# Patient Record
Sex: MALE | Race: WHITE | Employment: FULL TIME | URBAN - METROPOLITAN AREA
[De-identification: names, ages, dates, MRNs, and addresses within clinical notes are randomized per-mention and may not be internally consistent; named-entity substitution may affect disease eponyms.]

---

## 2019-11-06 ENCOUNTER — OFFICE VISIT (OUTPATIENT)
Dept: SLEEP MEDICINE | Facility: CLINIC | Age: 49
End: 2019-11-06
Payer: COMMERCIAL

## 2019-11-06 VITALS
OXYGEN SATURATION: 97 % | HEART RATE: 59 BPM | SYSTOLIC BLOOD PRESSURE: 118 MMHG | BODY MASS INDEX: 26.26 KG/M2 | RESPIRATION RATE: 20 BRPM | DIASTOLIC BLOOD PRESSURE: 86 MMHG | HEIGHT: 70 IN | WEIGHT: 183.4 LBS

## 2019-11-06 DIAGNOSIS — G47.33 OSA (OBSTRUCTIVE SLEEP APNEA): Primary | ICD-10-CM

## 2019-11-06 PROCEDURE — 99204 OFFICE O/P NEW MOD 45 MIN: CPT | Performed by: INTERNAL MEDICINE

## 2019-11-06 ASSESSMENT — MIFFLIN-ST. JEOR: SCORE: 1698.15

## 2019-11-06 NOTE — NURSING NOTE
"/86   Pulse 59   Resp 20   Ht 1.778 m (5' 10\")   Wt 83.2 kg (183 lb 6.4 oz)   SpO2 97%   BMI 26.32 kg/m      Neck 40cm/15.75inches    DME-cpap, but doesn't use    ESS- 12  CAITY-19    Med Rec-complete    Jade Sotomayor, Medical Assistant 11/6/2019 1:24 PM      "

## 2019-11-06 NOTE — PATIENT INSTRUCTIONS
Here are the ranges based off your height and current weight.    Body mass index is 26.32 kg/m .        Underweight = <18.5  Normal weight = 18.5-24.9   Overweight = 25-29.9   Obesity = BMI of 30 or greater       Your BMI is Body mass index is 26.32 kg/m .  Weight management is a personal decision.  If you are interested in exploring weight loss strategies, the following discussion covers the approaches that may be successful. Body mass index (BMI) is one way to tell whether you are at a healthy weight, overweight, or obese. It measures your weight in relation to your height.  A BMI of 18.5 to 24.9 is in the healthy range. A person with a BMI of 25 to 29.9 is considered overweight, and someone with a BMI of 30 or greater is considered obese. More than two-thirds of American adults are considered overweight or obese.  Being overweight or obese increases the risk for further weight gain. Excess weight may lead to heart disease and diabetes.  Creating and following plans for healthy eating and physical activity may help you improve your health.  Weight control is part of healthy lifestyle and includes exercise, emotional health, and healthy eating habits. Careful eating habits lifelong are the mainstay of weight control. Though there are significant health benefits from weight loss, long-term weight loss with diet alone may be very difficult to achieve- studies show long-term success with dietary management in less than 10% of people. Attaining a healthy weight may be especially difficult to achieve in those with severe obesity. In some cases, medications, devices and surgical management might be considered.  What can you do?  If you are overweight or obese and are interested in methods for weight loss, you should discuss this with your provider.     Consider reducing daily calorie intake by 500 calories.     Keep a food journal.     Avoiding skipping meals, consider cutting portions instead.    Diet combined with  exercise helps maintain muscle while optimizing fat loss. Strength training is particularly important for building and maintaining muscle mass. Exercise helps reduce stress, increase energy, and improves fitness. Increasing exercise without diet control, however, may not burn enough calories to loose weight.       Start walking three days a week 10-20 minutes at a time    Work towards walking thirty minutes five days a week     Eventually, increase the speed of your walking for 1-2 minutes at time    In addition, we recommend that you review healthy lifestyles and methods for weight loss available through the National Institutes of Health patient information sites:  http://win.niddk.nih.gov/publications/index.htm    And look into health and wellness programs that may be available through your health insurance provider, employer, local community center, or [employer].    Weight management plan: Patient was referred to their PCP to discuss a diet and exercise plan.    Your blood pressure was checked while you were in clinic today.  Please read the guidelines below about what these numbers mean and what you should do about them.  Your systolic blood pressure is the top number.  This is the pressure when the heart is pumping.  Your diastolic blood pressure is the bottom number.  This is the pressure in between beats.  If your systolic blood pressure is less than 120 and your diastolic blood pressure is less than 80, then your blood pressure is normal. There is nothing more that you need to do about it  If your systolic blood pressure is 120-139 or your diastolic blood pressure is 80-89, your blood pressure may be higher than it should be.  You should have your blood pressure re-checked within a year by a primary care provider.  If your systolic blood pressure is 140 or greater or your diastolic blood pressure is 90 or greater, you may have high blood pressure.  High blood pressure is treatable, but if left untreated  "over time it can put you at risk for heart attack, stroke, or kidney failure.  You should have your blood pressure re-checked by a primary care provider within the next four weeks.    MY TREATMENT INFORMATION FOR SLEEP APNEA-  Agus Kang    DOCTOR : Tiana Kraus MD  SLEEP CENTER :      MY CONTACT NUMBER:     Am I having a sleep study at a sleep center?  Make sure you have an appointment for the study before you leave!    Am I having a home sleep study?  Watch this video:  [email or web address]?[email or web address]  Please verify your insurance coverage with your insurance carrier    Frequently asked questions:  1. What is Obstructive Sleep Apnea (BEST)? BEST is the most common type of sleep apnea. Apnea means, \"without breath.\"  Apnea is most often caused by narrowing or collapse of the upper airway as muscles relax during sleep.   Almost everyone has occasional apneas. Most people with sleep apnea have had brief interruptions at night frequently for many years.  The severity of sleep apnea is related to how frequent and severe the events are.   2. What are the consequences of BEST? Symptoms include: feeling sleepy during the day, snoring loudly, gasping or stopping of breathing, trouble sleeping, and occasionally morning headaches or heartburn at night.  Sleepiness can be serious and even increase the risk of falling asleep while driving. Other health consequences may include development of high blood pressure and other cardiovascular disease in persons who are susceptible. Untreated BEST  can contribute to heart disease, stroke and diabetes.   3. What are the treatment options? In most situations, sleep apnea is a lifelong disease that must be managed with daily therapy. Medications are not effective for sleep apnea and surgery is generally not considered until other therapies have been tried. Your treatment is your choice . Continuous Positive " Airway (CPAP) works right away and is the therapy that is effective in nearly everyone. An oral device to hold your jaw forward is usually the next most reliable option. Other options include postioning devices (to keep you off your back), weight loss, and surgery including a tongue pacing device. There is more detail about some of these options below.    Important tips for using CPAP and similar devices   Know your equipment:  CPAP is continuous positive airway pressure that prevents obstructive sleep apnea by keeping the throat from collapsing while you are sleeping. In most cases, the device is  smart  and can slowly self-adjusts if your throat collapses and keeps a record every day of how well you are treated-this information is available to you and your care team.  BPAP is bilevel positive airway pressure that keeps your throat open and also assists each breath with a pressure boost to maintain adequate breathing.  Special kinds of BPAP are used in patients who have inadequate breathing from lung or heart disease. In most cases, the device is  smart  and can slowly self-adjusts to assist breathing. Like CPAP, the device keeps a record of how well you are treated.  Your mask is your connection to the device. You get to choose what feels most comfortable and the staff will help to make sure if fits. Here: are some examples of the different masks that are available:       Key points to remember on your journey with sleep apnea:  1. Sleep study.  PAP devices often need to be adjusted during a sleep study to show that they are effective and adjusted right.  2. Good tips to remember: Try wearing just the mask during a quiet time during the day so your body adapts to wearing it. A humidifier is recommended for comfort in most cases to prevent drying of your nose and throat. Allergy medication from your provider may help you if you are having nasal congestion.  3. Getting settled-in. It takes more than one night for  most of us to get used to wearing a mask. Try wearing just the mask during a quiet time during the day so your body adapts to wearing it. A humidifier is recommended for comfort in most cases. Our team will work with you carefully on the first day and will be in contact within 4 days and again at 2 and 4 weeks for advice and remote device adjustments. Your therapy is evaluated by the device each day.   4. Use it every night. The more you are able to sleep naturally for 7-8 hours, the more likely you will have good sleep and to prevent health risks or symptoms from sleep apnea. Even if you use it 4 hours it helps. Occasionally all of us are unable to use a medical therapy, in sleep apnea, it is not dangerous to miss one night.   5. Communicate. Call our skilled team on the number provided on the first day if your visit for problems that make it difficult to wear the device. Over 2 out of 3 patients can learn to wear the device long-term with help from our team. Remember to call our team or your sleep providers if you are unable to wear the device as we may have other solutions for those who cannot adapt to mask CPAP therapy. It is recommended that you sleep your sleep provider within the first 3 months and yearly after that if you are not having problems.   6. Use it for your health. We encourage use of CPAP masks during daytime quiet periods to allow your face and brain to adapt to the sensation of CPAP so that it will be a more natural sensation to awaken to at night or during naps. This can be very useful during the first few weeks or months of adapting to CPAP though it does not help medically to wear CPAP during wakefulness and  should not be used as a strategy just to meet guidelines.  7. Take care of your equipment. Make sure you clean your mask and tubing using directions every day and that your filter and mask are replaced as recommended or if they are not working.     BESIDES CPAP, WHAT OTHER THERAPIES ARE  THERE?    Positioning Device  Positioning devices are generally used when sleep apnea is mild and only occurs on your back.This example shows a pillow that straps around the waist. It may be appropriate for those whose sleep study shows milder sleep apnea that occurs primarily when lying flat on one's back. Preliminary studies have shown benefit but effectiveness at home may need to be verified by a home sleep test. These devices are generally not covered by medical insurance.  Examples of devices that maintain sleeping on the back to prevent snoring and mild sleep apnea.    Belt type body positioner  Http://[employer]/    Electronic reminder  Http://nightshifttherapy.com/  Http://www.awe.sm.[employer].au/      Oral Appliance  What is oral appliance therapy?  An oral appliance device fits on your teeth at night like a retainer used after having braces. The device is made by a specialized dentist and requires several visits over 1-2 months before a manufactured device is made to fit your teeth and is adjusted to prevent your sleep apnea. Once an oral device is working properly, snoring should be improved. A home sleep test may be recommended at that time if to determine whether the sleep apnea is adequately treated.       Some things to remember:  -Oral devices are often, but not always, covered by your medical insurance. Be sure to check with your insurance provider.   -If you are referred for oral therapy, you will be given a list of specialized dentists to consider or you may choose to visit the Web site of the American Academy of Dental Sleep Medicine  -Oral devices are less likely to work if you have severe sleep apnea or are extremely overweight.     More detailed information  An oral appliance is a small acrylic device that fits over the upper and lower teeth  (similar to a retainer or a mouth guard). This device slightly moves jaw forward, which moves the base of the tongue forward, opens the airway, improves  breathing for effective treat snoring and obstructive sleep apnea in perhaps 7 out of 10 people .  The best working devices are custom-made by a dental device  after a mold is made of the teeth 1, 2, 3.  When is an oral appliance indicated?  Oral appliance therapy is recommended as a first-line treatment for patients with primary snoring, mild sleep apnea, and for patients with moderate sleep apnea who prefer appliance therapy to use of CPAP4, 5. Severity of sleep apnea is determined by sleep testing and is based on the number of respiratory events per hour of sleep.   How successful is oral appliance therapy?  The success rate of oral appliance therapy in patients with mild sleep apnea is 75-80% while in patients with moderate sleep apnea it is 50-70%. The chance of success in patients with severe sleep apnea is 40-50%. The research also shows that oral appliances have a beneficial effect on the cardiovascular health of BEST patients at the same magnitude as CPAP therapy7.  Oral appliances should be a second-line treatment in cases of severe sleep apnea, but if not completely successful then a combination therapy utilizing CPAP plus oral appliance therapy may be effective. Oral appliances tend to be effective in a broad range of patients although studies show that the patients who have the highest success are females, younger patients, those with milder disease, and less severe obesity. 3, 6.   Finding a dentist that practices dental sleep medicine  Specific training is available through the American Academy of Dental Sleep Medicine for dentists interested in working in the field of sleep. To find a dentist who is educated in the field of sleep and the use of oral appliances, near you, visit the Web site of the American Academy of Dental Sleep Medicine.    References  1. Won et al. Objectively measured vs self-reported compliance during oral appliance therapy for sleep-disordered breathing. Chest  2013; 144(5): 6544-1534.  2. Dayami, et al. Objective measurement of compliance during oral appliance therapy for sleep-disordered breathing. Thorax 2013; 68(1): 91-96.  3. Asha et al. Mandibular advancement devices in 620 men and women with BEST and snoring: tolerability and predictors of treatment success. Chest 2004; 125: 1119-2619.  4. Scarlet et al. Oral appliances for snoring and BEST: a review. Sleep 2006; 29: 244-262.  5. Janette et al. Oral appliance treatment for BEST: an update. J Clin Sleep Med 2014; 10(2): 215-227.  6. Willy et al. Predictors of OSAH treatment outcome. J Dent Res 2007; 86: 5428-3136.      Weight Loss:    Weight loss is a long-term strategy that may improve sleep apnea in some patients.    Weight management is a personal decision and the decision should be based on your interest and the potential benefits.  If you are interested in exploring weight loss strategies, the following discussion covers the impact on weight loss on sleep apnea and the approaches that may be successful.    Being overweight does not necessarily mean you will have health consequences.  Those who have BMI over 35 or over 27 with existing medical conditions carries greater risk.   Weight loss decreases severity of sleep apnea in most people with obesity. For those with mild obesity who have developed snoring with weight gain, even 15-30 pound weight loss can improve and occasionally eliminate sleep apnea.  Structured and life-long dietary and health habits are necessary to lose weight and keep healthier weight levels.     Though there may be significant health benefits from weight loss, long-term weight loss is very difficult to achieve- studies show success with dietary management in less than 10% of people. In addition, substantial weight loss may require years of dietary control and may be difficult if patients have severe obesity. In these cases, surgical management may be considered.  Finally,  older individuals who have tolerated obesity without health complications may be less likely to benefit from weight loss strategies.        Your BMI is Body mass index is 26.32 kg/m .  Weight management is a personal decision.  If you are interested in exploring weight loss strategies, the following discussion covers the approaches that may be successful. Body mass index (BMI) is one way to tell whether you are at a healthy weight, overweight, or obese. It measures your weight in relation to your height.  A BMI of 18.5 to 24.9 is in the healthy range. A person with a BMI of 25 to 29.9 is considered overweight, and someone with a BMI of 30 or greater is considered obese. More than two-thirds of American adults are considered overweight or obese.  Being overweight or obese increases the risk for further weight gain. Excess weight may lead to heart disease and diabetes.  Creating and following plans for healthy eating and physical activity may help you improve your health.  Weight control is part of healthy lifestyle and includes exercise, emotional health, and healthy eating habits. Careful eating habits lifelong are the mainstay of weight control. Though there are significant health benefits from weight loss, long-term weight loss with diet alone may be very difficult to achieve- studies show long-term success with dietary management in less than 10% of people. Attaining a healthy weight may be especially difficult to achieve in those with severe obesity. In some cases, medications, devices and surgical management might be considered.  What can you do?  If you are overweight or obese and are interested in methods for weight loss, you should discuss this with your provider.     Consider reducing daily calorie intake by 500 calories.     Keep a food journal.     Avoiding skipping meals, consider cutting portions instead.    Diet combined with exercise helps maintain muscle while optimizing fat loss. Strength training  is particularly important for building and maintaining muscle mass. Exercise helps reduce stress, increase energy, and improves fitness. Increasing exercise without diet control, however, may not burn enough calories to loose weight.       Start walking three days a week 10-20 minutes at a time    Work towards walking thirty minutes five days a week     Eventually, increase the speed of your walking for 1-2 minutes at time    In addition, we recommend that you review healthy lifestyles and methods for weight loss available through the National Institutes of Health patient information sites:  http://win.niddk.nih.gov/publications/index.htm    And look into health and wellness programs that may be available through your health insurance provider, employer, local community center, or [employer].          Surgery:    Surgery for obstructive sleep apnea is considered generally only when other therapies fail to work. Surgery may be discussed with you if you are having a difficult time tolerating CPAP and or when there is an abnormal structure that requires surgical correction.  Nose and throat surgeries often enlarge the airway to prevent collapse.  Most of these surgeries create pain for 1-2 weeks and up to half of the most common surgeries are not effective throughout life.  You should carefully discuss the benefits and drawbacks to surgery with your sleep provider and surgeon to determine if it is the best solution for you.   More information  Surgery for BEST is directed at areas that are responsible for narrowing or complete obstruction of the airway during sleep.  There are a wide range of procedures available to enlarge and/or stabilize the airway to prevent blockage of breathing in the three major areas where it can occur: the palate, tongue, and nasal regions.  Successful surgical treatment depends on the accurate identification of the factors responsible for obstructive sleep apnea in each person.  A personalized  approach is required because there is no single treatment that works well for everyone.  Because of anatomic variation, consultation with an examination by a sleep surgeon is a critical first step in determining what surgical options are best for each patient.  In some cases, examination during sedation may be recommended in order to guide the selection of procedures.  Patients will be counseled about risks and benefits as well as the typical recovery course after surgery. Surgery is typically not a cure for a person s BEST.  However, surgery will often significantly improve one s BEST severity (termed  success rate ).  Even in the absence of a cure, surgery will decrease the cardiovascular risk associated with OSA7; improve overall quality of life8 (sleepiness, functionality, sleep quality, etc).      Palate Procedures:  Patients with BEST often have narrowing of their airway in the region of their tonsils and uvula.  The goals of palate procedures are to widen the airway in this region as well as to help the tissues resist collapse.  Modern palate procedure techniques focus on tissue conservation and soft tissue rearrangement, rather than tissue removal.  Often the uvula is preserved in this procedure. Residual sleep apnea is common in patient after pharyngoplasty with an average reduction in sleep apnea events of 33%2.      Tongue Procedures:  ExamWhile patients are awake, the muscles that surround the throat are active and keep this region open for breathing. These muscles relax during sleep, allowing the tongue and other structures to collapse and block breathing.  There are several different tongue procedures available.  Selection of a tongue base procedure depends on characteristics seen on physical exam.  Generally, procedures are aimed at removing bulky tissues in this area or preventing the back of the tongue from falling back during sleep.  Success rates for tongue surgery range from 50-62%3.    Hypoglossal  Nerve Stimulation:  Hypoglossal nerve stimulation has recently received approval from the [place] Food and Drug Administration for the treatment of obstructive sleep apnea.  This is based on research showing that the system was safe and effective in treating sleep apnea6.  Results showed that the median AHI score decreased 68%, from 29.3 to 9.0. This therapy uses an implant system that senses breathing patterns and delivers mild stimulation to airway muscles, which keeps the airway open during sleep.  The system consists of three fully implanted components: a small generator (similar in size to a pacemaker), a breathing sensor, and a stimulation lead.  Using a small handheld remote, a patient turns the therapy on before bed and off upon awakening.    Candidates for this device must be greater than 22 years of age, have moderate to severe BEST (AHI between 20-65), BMI less than 32, have tried CPAP/oral appliance without success, and have appropriate upper airway anatomy (determined by a sleep endoscopy performed by Dr. Carty).    Hypoglossal Nerve Stimulation Pathway:    The sleep surgeon s office will work with the patient through the insurance prior-authorization process (including communications and appeals).    Nasal Procedures:  Nasal obstruction can interfere with nasal breathing during the day and night.  Studies have shown that relief of nasal obstruction can improve the ability of some patients to tolerate positive airway pressure therapy for obstructive sleep apnea1.  Treatment options include medications such as nasal saline, topical corticosteroid and antihistamine sprays, and oral medications such as antihistamines or decongestants. Non-surgical treatments can include external nasal dilators for selected patients. If these are not successful by themselves, surgery can improve the nasal airway either alone or in combination with these other options.      Combination Procedures:  Combination of  surgical procedures and other treatments may be recommended, particularly if patients have more than one area of narrowing or persistent positional disease.  The success rate of combination surgery ranges from 66-80%2,3.    References  1. Isabel HO. The Role of the Nose in Snoring and Obstructive Sleep Apnoea: An Update.  Eur Arch Otorhinolaryngol. 2011; 268: 1365-73.  2.  Jomar SM; Hoa JA; Lucy JR; Pallanch JF; Niki MB; Oskar SG; Jp CROWLEY. Surgical modifications of the upper airway for obstructive sleep apnea in adults: a systematic review and meta-analysis. SLEEP 2010;33(10):3136-4193. Paula SENIOR. Hypopharyngeal surgery in obstructive sleep apnea: an evidence-based medicine review.  Arch Otolaryngol Head Neck Surg. 2006 Feb;132(2):206-13.  3. Allen YH1, Addy Y, Jose Daniel MEÑO. The efficacy of anatomically based multilevel surgery for obstructive sleep apnea. Otolaryngol Head Neck Surg. 2003 Oct;129(4):327-35.  4. Paula SENIOR, Goldberg A. Hypopharyngeal Surgery in Obstructive Sleep Apnea: An Evidence-Based Medicine Review. Arch Otolaryngol Head Neck Surg. 2006 Feb;132(2):206-13.  5. Henny LIN et al. Upper-Airway Stimulation for Obstructive Sleep Apnea.  N Engl J Med. 2014 Jan 9;370(2):139-49.  6. Nel Y et al. Increased Incidence of Cardiovascular Disease in Middle-aged Men with Obstructive Sleep Apnea. Am J Respir Crit Care Med; 2002 166: 159-165  7. Jerad EM et al. Studying Life Effects and Effectiveness of Palatopharyngoplasty (SLEEP) study: Subjective Outcomes of Isolated Uvulopalatopharyngoplasty. Otolaryngol Head Neck Surg. 2011; 144: 623-631.      .Please do not drive/operate heavy machinery if  drowsy or sleepy;  pull over if drowsy.
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
## 2019-11-14 ENCOUNTER — OFFICE VISIT (OUTPATIENT)
Dept: SLEEP MEDICINE | Facility: CLINIC | Age: 49
End: 2019-11-14
Payer: COMMERCIAL

## 2019-11-14 ENCOUNTER — TRANSFERRED RECORDS (OUTPATIENT)
Dept: HEALTH INFORMATION MANAGEMENT | Facility: CLINIC | Age: 49
End: 2019-11-14

## 2019-11-14 DIAGNOSIS — G47.33 OSA (OBSTRUCTIVE SLEEP APNEA): ICD-10-CM

## 2019-11-14 DIAGNOSIS — G47.33 OBSTRUCTIVE SLEEP APNEA (ADULT) (PEDIATRIC): ICD-10-CM

## 2019-11-14 PROCEDURE — 95800 SLP STDY UNATTENDED: CPT | Performed by: INTERNAL MEDICINE

## 2019-11-14 NOTE — PROGRESS NOTES
Pt is completing a home sleep test. Pt was instructed on how to put on the Noxturnal T3 device and associated equipment before going to bed and given the opportunity to practice putting it on before leaving the sleep center. Pt was reminded to bring the home sleep test kit back to the center tomorrow, at agreed upon time for download and reporting. Patient had bracelet secured to right wrist, instructed on use of WATCHPAT device

## 2019-11-15 ENCOUNTER — DOCUMENTATION ONLY (OUTPATIENT)
Dept: SLEEP MEDICINE | Facility: CLINIC | Age: 49
End: 2019-11-15
Payer: COMMERCIAL

## 2019-11-15 NOTE — PROCEDURES
"WatchPAT - HOME SLEEP STUDY INTERPRETATION    Patient: Agus Kang  MRN: [id number]  YOB: 1970  Study Date: 11/15/2019  Referring Provider: No Ref-Primary, Physician;   Ordering Provider: Miranda Kraus MD    Chain of custody patient verification was enabled.  Chain of custody verification was present throughout the entire study.     Indications for Home Study: Agus Kang is a 49 year old male with previous history of obstructive sleep apnea who presents for verification of current severity with symptoms suggestive of obstructive sleep apnea.  The medical records suggest this patient may be a  however I was unable to confirm this by telephone today.    Estimated body mass index is 26.32 kg/m  as calculated from the following:    Height as of 11/6/19: 1.778 m (5' 10\").    Weight as of 11/6/19: 83.2 kg (183 lb 6.4 oz).  Total score - [place]: 12 (11/6/2019  1:17 PM)  STOP-BANG: Previously diagnosed    Data: A full night home sleep study was performed recording the standard physiologic parameters including peripheral arterial tonometry (PAT), sound/snoring, body position,  movement, sound, and oxygen saturation by pulse oximetry. Pulse rate was estimated by oximetry recording. Sleep staging (wake, REM, light, and deep sleep) was derived from PAT signal.  This study was considered adequate based on > 4 hours of quality oximetry and respiratory recording. As specified by the AASM Manual for the Scoring of Sleep and Associated events, version 2.3, Rule VIII.D 1B, 4% oxygen desaturation scoring for hypopneas is used as a standard of care on all home sleep apnea testing.    Total Recording Time: 7  hrs, 3 min  Total Sleep Time: 6 hrs, 44 min  % of Sleep Time REM: 22.4%    Respiratory:  Snoring: Snoring was present up to 70 dB.  Respiratory events: The PAT respiratory disturbance index [pRDI] was 54 events per hour.  The PAT apnea/hypopnea index [pAHI] was 53 events per hour.  HERIBERTO was " 52 events per hour.  During REM sleep the pAHI was 39.  Sleep Associated Hypoxemia: sustained hypoxemia was present. Mean oxygen saturation was 91 %.  Minimum was 70 %.  Time with saturation less than 88% was 75 minutes.    Heart Rate: By pulse oximetry normal rate was noted.     Position: Percent of time spent: supine -87 %, prone -0 %, on right -13 %, on left -0 5%.  pAHI was 55 per hour supine, - per hour prone, 39 per hour on right side, and - per hour on left side.     Assessment:   Severe obstructive sleep apnea.  Sleep associated hypoxemia was present.    Recommendations:  The most immediately effective therapy for resolution of sleep apnea and associated symptoms is auto titration CPAP.  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    Suggest optimizing sleep hygiene and avoiding sleep deprivation.  Weight management.    Diagnosis Code(s): Obstructive Sleep Apnea G47.33, Hypoxemia G47.36    SHYLA COHEN MD, November 15, 2019   Diplomate, American Board of Internal Medicine, Sleep Medicine

## 2019-11-15 NOTE — PROGRESS NOTES
Telephone message left for the patient to indicate that he has to refer to his MyChart for results of his peripheral arterial tonometry sleep study demonstrating severe obstructive sleep apnea.  We have offered him assistance in initiating treatment so that he may need the AASI Guide for Aviation Medical Examiners:    JOSEPH Assisted - All Classes - Obstructive Sleep Apnea (BEST)  Examiners may re-issue an airman medical certificate to airmen currently on an AASI for BEST if the airman provides the following:   An Authorization granted by the FAA;   Signed Airman Compliance with Treatment form or equivalent from the airman attesting to absence of BEST symptoms and continued daily use of prescribed therapy; and   A current status report from the treating physician indicating that BEST treatment is still effective.   For CPAP/ BIPAP/ APAP:   A copy of the cumulative annual PAP device report which shows actual time used (rather than a report typically generated for insurance providers which only shows if use is greater or less than 4 hours). Target goal should show use for at least 75% of sleep periods and an average minimum of 6 hours use per sleep period.   For persons with an established diagnosis of BEST who do not have a recording CPAP, a one year exception will be allowed to provide a personal statement that they regularly use CPAP and before each shift when performing flight or safety duties.   For Dental Devices and/or for Positional Devices: No conditions known to be co-morbid with BEST (e.g., diabetes mellitus, hypertension treated with more than two medications, atrial fibrillation, etc). Once Dental Devices with recording / monitoring capability are available, reports must be submitted.   For Surgery: For successfully treated surgical patients, a statement attesting to the continued absence of BEST symptoms is required.   Defer to the [facility] or the Region for further review if:   Concerns about adequacy of therapy or  non-compliance;   Significant weight gain or development of conditions known to be co-morbid with BEST (e.g., diabetes mellitus, hypertension treated with more than two medications, atrial fibrillation, etc).   Note: The Examiner may request AMCD review to discontinue the AASI if there are indications that the airman no longer has BEST (e.g., significant weight loss and a negative study or surgical intervention followed by 3 years of symptom abatement and absence of significant weight gain or co-morbid conditions). In most cases, a follow-up sleep study will be required to remove the AASI.

## 2019-11-20 ENCOUNTER — VIRTUAL VISIT (OUTPATIENT)
Dept: SLEEP MEDICINE | Facility: CLINIC | Age: 49
End: 2019-11-20
Payer: COMMERCIAL

## 2019-11-20 DIAGNOSIS — G47.33 OSA ON CPAP: Primary | ICD-10-CM

## 2019-11-20 PROCEDURE — 99442 ZZC PHYSICIAN TELEPHONE EVALUATION 11-20 MIN: CPT | Performed by: INTERNAL MEDICINE

## 2019-11-20 NOTE — PROGRESS NOTES
Patient opted to conduct today's return visit via telephone vs an in person visit to the clinic.    I spoke with: patient    The reason for the telephone visit was: follow up on sleep study test results    Jade Sotomayor, Medical Assistant 11/20/2019 11:49 AM

## 2019-11-20 NOTE — Clinical Note
Don harrington, spoke to pt. He is willing to try CPAP. DME orders in [facility]. Plz call [facility] for urgent CPAP set up. He will shanel f/u  visit in 4-5 weeks after starting CPAP.Thanks,Miranda

## 2019-11-25 NOTE — PROGRESS NOTES
Sleep clinic telephone visit note    Date of  visit: November 20,2019    Purpose of visit: Review results of home sleep study     HPI: Agus Kang is a 49 year old male with previous history of obstructive sleep apnea who presented to sleep clinic  for verification of current severity with symptoms suggestive of obstructive sleep apnea.  WatchPAT was obtained on 11/15/2019 to evaluate for BEST.  Telephone visit has been scheduled today to review the test results and discuss plan of care.    WatchPAT results(11/15/2019) :  Total Recording Time: 7  hrs, 3 min   Total Sleep Time: 6 hrs, 44 min   % of Sleep Time REM: 22.4%     Respiratory:   Snoring: Snoring was present.  Respiratory events: The PAT respiratory disturbance index [pRDI] was 54 events per hour.  The PAT apnea/hypopnea index [pAHI] was 53 events per hour.  HERIBERTO was 52 events per hour.  During REM sleep the pAHI was 39.   Sleep Associated Hypoxemia: sustained hypoxemia was present. Mean oxygen saturation was 91 %.  Minimum was 70 %.  Time with saturation less than 88% was 75 minutes.     Heart Rate: By pulse oximetry normal rate was noted.     Position: Percent of time spent: supine -87 %, prone -0 %, on right -13 %, on left -0 5%.   pAHI was 55 per hour supine, - per hour prone, 39 per hour on right side, and - per hour on left side.     Assessment:   Severe obstructive sleep apnea.   Sleep associated hypoxemia was present.     The test results were  discussed with the patient in detail. Patient also received MyChart communication about the results and plan of care from Dr. Johnson.    Assessment/plan:  Severe obstructive sleep apnea with sleep associated hypoxemia.  We discussed the consequences of untreated sleep apnea of this degree of severity and the need for treatment.  We also discussed that the most immediately effective therapy for resolution of sleep apnea and associated symptoms is auto titrating continuous positive airway pressure-CPAP.  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     He was apprehensive about the CPAP from his previous experience. I explained to him that this time it will be through the proper channel, going through the DME getting proper mask fitting and follow-up with our sleep therapy management program and review of compliance measures etc. with which hopefully he will be able to comply with the treatment.  He was willing to try the auto CPAP.  Prescription was provided for auto CPAP with pressure settings 5-15 cmH2O. Recommended him to use the device regularly during sleep and instructed him to get back to us if in case he has any interface problems.  Recommend obtaining an overnight oximetry in 7 to 10 days after initiating CPAP treatment to check for resolution of the hypoxemia. Results of the oximetry will be communicated with him via my chart. He will follow-up at the sleep clinic in 4 to 5 weeks after initiating treatment from the compliance measures will be reviewed.    Patient was strongly advised to avoid flying, driving, operating any heavy machinery or other hazardous situations while drowsy or sleepy.  Patient was counseled on the importance of driving while alert, to pull over if drowsy, or nap before getting into the vehicle if sleepy.     The above note was dictated using voice recognition software. Although reviewed after completion, some word and grammatical error may remain . Please contact the author for any clarifications.    \"I spent a total of 16 minutes with Agus Kang during today's telephone visit. Over 50% of this time was spent counseling the patient and  coordinating care regarding BEST, CPAP treatment.\"     Tiana Kraus MD  [facility] [address] " [id number]  [phone]  Dept: [phone]    .

## 2019-11-26 NOTE — PROGRESS NOTES
Emailed urgent setup to [facility] Sylvie GARCIA. To call Agus to setup on autopap.

## 2019-11-29 ENCOUNTER — DOCUMENTATION ONLY (OUTPATIENT)
Dept: SLEEP MEDICINE | Facility: CLINIC | Age: 49
End: 2019-11-29
Payer: COMMERCIAL

## 2019-11-29 NOTE — PROGRESS NOTES
Patient was offered choice of vendor and chose [employer].  Patient Agus Kang was set up at [place] on November 29, 2019. Patient received a [employer] REMstar Auto with A-Flex (M Series) Auto. Pressures were set at 5-15 cm H2O.   Patient s ramp is 5 cm H2O for 45 min and FLEX/EPR is 2.  Patient received a [employer] Dreamwear   Full Face mask size Large, heated tubing and heated humidifier.  Patient is enrolled in the STM Program and does not need to meet compliance. Patient brought in his own machine so he has been tagged in [employer] and Dr. Ballesteros has been tagged as well.   ODALYS MARTINEZ

## 2019-12-02 ENCOUNTER — DOCUMENTATION ONLY (OUTPATIENT)
Dept: SLEEP MEDICINE | Facility: CLINIC | Age: 49
End: 2019-12-02

## 2019-12-02 NOTE — PROGRESS NOTES
3 DAY STM VISIT    Diagnostic AHI: 53    watch PAT    Patient contacted for 3 day STM visit  Subjective measures:  Pt brought home the wrong mask from the supply set up.  He is currently traveling and will start using the new mask.   First few nights have been difficult with the older mask.      Replacement device: No  STM ordered by provider: Yes     Device type: Auto-CPAP  PAP settings from order::  CPAP min 5 cm  H20       CPAP max 15 cm  H20        Mask type:    Nasal Mask     Device settings from machine-no modem reporting.         Assessment: pt reports using the device nightly however shorter usage times due to mask removal because of discomfort from old mask.   Action plan: Patient to have 14 day STM visit. Patient has a follow up visit scheduled:   yes within 31-90 days of set up.    Total time spent on remote patient monitoring data analysis and patient contact today:   14  minutes

## 2019-12-08 ENCOUNTER — HEALTH MAINTENANCE LETTER (OUTPATIENT)
Age: 49
End: 2019-12-08

## 2019-12-10 ENCOUNTER — TELEPHONE (OUTPATIENT)
Dept: SLEEP MEDICINE | Facility: CLINIC | Age: 49
End: 2019-12-10

## 2019-12-10 NOTE — TELEPHONE ENCOUNTER
RADHAM asking Agus to please schedule a return visit with DR. Ballesteros via [employer], or call our scheduling.  He was setup on his own machine M series respironic, explained we would want him to bring the machine to clinic for manual download.

## 2019-12-16 ENCOUNTER — DOCUMENTATION ONLY (OUTPATIENT)
Dept: SLEEP MEDICINE | Facility: CLINIC | Age: 49
End: 2019-12-16

## 2019-12-16 NOTE — PROGRESS NOTES
14  DAY STM VISIT    Diagnostic AHI:    53 events per hourwatch PAT    Subjective measures:   Last night was the best night so far. He has been using every night.    He has been adjusting mask and has been traveling between time zones.   Newer mask has been an improvement.  No modem in his device due to age.       Assessment: 2 Patient meeting subjective benchmarks.     Action plan: pt to have 30 day STM visit.      Device type: Auto-CPAP       Mask type:  Nasal Mask        Total time spent with direct patient communication :   4 minutes

## 2019-12-30 ENCOUNTER — DOCUMENTATION ONLY (OUTPATIENT)
Dept: SLEEP MEDICINE | Facility: CLINIC | Age: 49
End: 2019-12-30

## 2019-12-30 NOTE — PROGRESS NOTES
30 DAY STM VISIT    Diagnostic AHI:    53 events per hourwatch PAT    Subjective measures:   Pt reports usage is increasing nightly.  He has an irregular sleep scheduled due to work.      Assessment:  Patient meeting subjective benchmarks.   Action plan: pt to have 6 month [facility] visit  Patient has not scheduled a follow up visit with Dr. Kraus.     Device type: Auto-CPAP     Mask type:  Nasal Mask  Objective measures: 14 day rolling measures -no modem in device due to age of device.           Objective measure goal  Compliance   Goal >70%  Leak   Goal < 24 lpm  AHI  Goal < 5  Usage  Goal >240            Total time spent with direct patient communication :   5 minutes     No modem for data

## 2020-02-03 ENCOUNTER — DOCUMENTATION ONLY (OUTPATIENT)
Dept: SLEEP MEDICINE | Facility: CLINIC | Age: 50
End: 2020-02-03
Payer: COMMERCIAL

## 2020-02-03 NOTE — PROGRESS NOTES
Agus wanted download of cpap has respironic 550 autopap .  He wanted Dr. 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  Dr. Ballesteros deferred the download and form to Dr. Jonhson, to review.

## 2020-03-12 ENCOUNTER — DOCUMENTATION ONLY (OUTPATIENT)
Dept: SLEEP MEDICINE | Facility: CLINIC | Age: 50
End: 2020-03-12
Payer: COMMERCIAL

## 2020-03-12 NOTE — PROGRESS NOTES
STM Recheck: Patient called and said he would like to make an appointment to have a CPAP download at the end of April. Left message with [facility].

## 2020-03-27 ENCOUNTER — TELEPHONE (OUTPATIENT)
Dept: SLEEP MEDICINE | Facility: CLINIC | Age: 50
End: 2020-03-27

## 2020-04-10 ENCOUNTER — TELEPHONE (OUTPATIENT)
Dept: SLEEP MEDICINE | Facility: CLINIC | Age: 50
End: 2020-04-10

## 2020-04-10 NOTE — TELEPHONE ENCOUNTER
Left message informing him that he would need to schedule an appointment with Dr. Ballesteros to discuss medication. Copy of patients cpap prescription will be mailed to the address on file. Ph number left for pt to contact [phone].

## 2020-04-10 NOTE — TELEPHONE ENCOUNTER
----- Message from Greta Hercules sent at 4/9/2020  3:58 PM CDT -----  Hi can someone please reach out to this patient. He has   some questions regarding medications and an needs a paper copy of his RX.  Phone number : [phone]      Thanks    Lauren

## 2020-04-13 ENCOUNTER — DOCUMENTATION ONLY (OUTPATIENT)
Dept: SLEEP MEDICINE | Facility: CLINIC | Age: 50
End: 2020-04-13
Payer: COMMERCIAL

## 2020-04-13 NOTE — PROGRESS NOTES
STM Recheck: Patient called and he is frustrated with his care tried to help patient call down. Patient in danger of losing his job with [employer]. Patient will try and page his Provider.

## 2020-04-22 ENCOUNTER — TELEPHONE (OUTPATIENT)
Dept: SLEEP MEDICINE | Facility: CLINIC | Age: 50
End: 2020-04-22

## 2020-04-22 NOTE — TELEPHONE ENCOUNTER
Received fax from [facility] for cpap orders. Orders signed and faxed back 4/22/2020    Mirella Dodge [facility] ~[place]

## 2020-05-04 ENCOUNTER — APPOINTMENT (OUTPATIENT)
Dept: GENERAL RADIOLOGY | Facility: CLINIC | Age: 50
End: 2020-05-04
Attending: EMERGENCY MEDICINE
Payer: COMMERCIAL

## 2020-05-04 ENCOUNTER — HOSPITAL ENCOUNTER (EMERGENCY)
Facility: CLINIC | Age: 50
Discharge: HOME | End: 2020-05-04
Attending: EMERGENCY MEDICINE | Admitting: EMERGENCY MEDICINE
Payer: COMMERCIAL

## 2020-05-04 VITALS
DIASTOLIC BLOOD PRESSURE: 92 MMHG | RESPIRATION RATE: 16 BRPM | TEMPERATURE: 97.6 F | OXYGEN SATURATION: 98 % | HEART RATE: 65 BPM | SYSTOLIC BLOOD PRESSURE: 134 MMHG

## 2020-05-04 DIAGNOSIS — S61.211A LACERATION OF LEFT INDEX FINGER WITHOUT FOREIGN BODY WITHOUT DAMAGE TO NAIL, INITIAL ENCOUNTER: ICD-10-CM

## 2020-05-04 PROCEDURE — 90471 IMMUNIZATION ADMIN: CPT

## 2020-05-04 PROCEDURE — 73140 X-RAY EXAM OF FINGER(S): CPT | Mod: LT

## 2020-05-04 PROCEDURE — 99283 EMERGENCY DEPT VISIT LOW MDM: CPT | Mod: 25

## 2020-05-04 PROCEDURE — 12002 RPR S/N/AX/GEN/TRNK2.6-7.5CM: CPT

## 2020-05-04 PROCEDURE — 90715 TDAP VACCINE 7 YRS/> IM: CPT | Performed by: EMERGENCY MEDICINE

## 2020-05-04 PROCEDURE — 25000128 H RX IP 250 OP 636: Performed by: EMERGENCY MEDICINE

## 2020-05-04 RX ORDER — LIDOCAINE HYDROCHLORIDE 10 MG/ML
INJECTION, SOLUTION INFILTRATION; PERINEURAL
Status: DISCONTINUED
Start: 2020-05-04 | End: 2020-05-04 | Stop reason: HOSPADM

## 2020-05-04 RX ADMIN — CLOSTRIDIUM TETANI TOXOID ANTIGEN (FORMALDEHYDE INACTIVATED), CORYNEBACTERIUM DIPHTHERIAE TOXOID ANTIGEN (FORMALDEHYDE INACTIVATED), BORDETELLA PERTUSSIS TOXOID ANTIGEN (GLUTARALDEHYDE INACTIVATED), BORDETELLA PERTUSSIS FILAMENTOUS HEMAGGLUTININ ANTIGEN (FORMALDEHYDE INACTIVATED), BORDETELLA PERTUSSIS PERTACTIN ANTIGEN, AND BORDETELLA PERTUSSIS FIMBRIAE 2/3 ANTIGEN 0.5 ML: 5; 2; 2.5; 5; 3; 5 INJECTION, SUSPENSION INTRAMUSCULAR at 15:09

## 2020-05-04 ASSESSMENT — ENCOUNTER SYMPTOMS: WOUND: 1

## 2020-05-04 NOTE — ED PROVIDER NOTES
History     Chief Complaint:  Laceration      HPI   Agus Kang is a 49 year old right handed male who presents with laceration. The patient was using a saw to cut a branch earlier today when the branch fell and lacerated the back of his left index finger. Last tetanus was 2003.    Allergies:  Cats     Medications:    Medications reviewed. No current medications.     Past Medical History:    Medical history reviewed. No pertinent medical history.    Past Surgical History:    Surgical history reviewed. No pertinent surgical history.    Family History:    Family history reviewed. No pertinent family history.      Social History:  Smoking Status: former  Smokeless Tobacco: current, chew    Review of Systems   Skin: Positive for wound (laceration to the back of left index finger).   All other systems reviewed and are negative.        Physical Exam     Patient Vitals for the past 24 hrs:   BP Temp Temp src Pulse Resp SpO2   05/04/20 1656 -- -- -- -- 16 --   05/04/20 1437 (!) 134/92 97.6  F (36.4  C) Temporal 65 20 98 %        Physical Exam   General: Patient is alert, awake and interactive  Head: The scalp, face, and head appear normal  Eyes: Conjunctivae are normal  ENT: The nose is normal, Pinnae are normal, External acoustic canals are normal  Neck: Trachea midline  CV: Pulses are normal.   Resp: No respiratory distress   Musc: Normal muscular tone, moving all extremities.  Left hand Exam:  Laceration: 4 cm crescent laceration over the PIP joint of the left index finger  Palm: normal   MCP: full flex/ext  PIP: full flex/ext  DIP: full flex/ext  Cap refil: < 2 seconds  Sensation: Intact to light touch  Radial pulse normal  Ulnar pulse  normal  Left wrist: PROM/AROM/Strength WNL, no snuffbox tenderness  Left elbow: PROM/AROM/Strength WNL  Skin: No rash or lesions noted  Neuro: Speech is normal and fluent. Face is symmetric.   Psych: Normal affect.  Appropriate interactions.    Emergency Department Course      Imaging:  Radiology findings were communicated with the patient who voiced understanding of the findings.    Fingers XR, 2-3 views, left  Final Result  IMPRESSION: Negative exam.  VENU DODD MD  Reading per radiology       Procedures     Laceration Repair        LACERATION:  A simple and superficial clean 4 cm laceration.      LOCATION:  Index finger pip joint      FUNCTION:  Distally sensation, circulation, motor and tendon   function are intact.      ANESTHESIA:  Digital block using lidocaine 1% total of 3 mLs      PREPARATION:  Irrigation and Scrubbing with Shur Clens      DEBRIDEMENT:  no debridement      CLOSURE:  Wound was closed with One Layer.  Skin closed with 7 x 4.0 Ethylon using interrupted sutures.     Interventions:  1509 Adacel 0.5 mL IM    Emergency Department Course:  Past medical records, nursing notes, and vitals reviewed.    1442 I performed an exam of the patient as documented above.       1650 Patient rechecked and updated.       Findings and plan explained to the Patient. Patient discharged home with instructions regarding supportive care, medications, and reasons to return. The importance of close follow-up was reviewed.      Impression & Plan     Medical Decision Making:  Agus Kang is a 49 year old male who presents for evaluation of a laceration to the left index finger.  The wound was carefully evaluated and explored.  The laceration was closed with sutures as noted above.  There is no evidence of muscular, tendon, or bony damage with this laceration.  X-ray does not show any signs of fracture or dislocation. No signs of foreign body.  Possible complications (infection, scarring) were reviewed with the patient.  Follow up with primary care as noted in the discharge section.    Discharge Diagnosis:    ICD-10-CM    1. Laceration of left index finger without foreign body without damage to nail, initial encounter  S61.211A        Disposition:  The patient is discharged to  home.    Scribe Disclosure:  I, Wayne Barrientos, am serving as a scribe at 2:42 PM on 5/4/2020 to document services personally performed by Maximiliano Licona MD based on my observations and the provider's statements to me.      5/4/2020   Maximiliano Licona MD Battista, Christopher Joseph, MD  05/04/20 1384

## 2020-05-04 NOTE — ED AVS SNAPSHOT
[facility] Emergency Department  [address]  [facility] [address]  Phone:  [phone]  Fax:  [phone]                                    Agus Kang   MRN: [id number]    Department:  [facility] Emergency Department   Date of Visit:  5/4/2020           After Visit Summary Signature Page    I have received my discharge instructions, and my questions have been answered. I have discussed any challenges I see with this plan with the nurse or doctor.    ..........................................................................................................................................  Patient/Patient Representative Signature      ..........................................................................................................................................  Patient Representative Print Name and Relationship to Patient    ..................................................               ................................................  Date                                   Time    ..........................................................................................................................................  Reviewed by Signature/Title    ...................................................              ..............................................  Date                                               Time          22EPIC Rev 08/18

## 2020-11-02 NOTE — TELEPHONE ENCOUNTER
SUBJECTIVE:    Patient called requesting a sleep aid.  He is trying to meet qualifications for FAA and PAP therapy usage requirements, however he is unable to fall a sleep or stay asleep with device.  He would like to have a sleep aid prescribed to him.     OBJECTIVE:  No data to review from his PAP device as it does not have modem.    ASSESSMENT/PLAN:  Routing chart to provider regarding request for sleep aid.    
02-Nov-2020 16:54

## 2021-01-14 ENCOUNTER — HEALTH MAINTENANCE LETTER (OUTPATIENT)
Age: 51
End: 2021-01-14

## 2021-03-02 ENCOUNTER — CARE COORDINATION (OUTPATIENT)
Dept: SLEEP MEDICINE | Facility: CLINIC | Age: 51
End: 2021-03-02

## 2021-03-03 NOTE — PROGRESS NOTES
Received faxed FMLA or leave of absence Medical Certification Employee's Serious Health Condition forms from [employer] Quevedo.  Scanned into epic and routed message and forms to Dr. Ballesteros who is working remotely.

## 2021-03-15 ENCOUNTER — TELEPHONE (OUTPATIENT)
Dept: SLEEP MEDICINE | Facility: CLINIC | Age: 51
End: 2021-03-15

## 2021-03-15 ENCOUNTER — VIRTUAL VISIT (OUTPATIENT)
Dept: SLEEP MEDICINE | Facility: CLINIC | Age: 51
End: 2021-03-15
Payer: COMMERCIAL

## 2021-03-15 VITALS — HEIGHT: 70 IN | BODY MASS INDEX: 24.34 KG/M2 | WEIGHT: 170 LBS

## 2021-03-15 DIAGNOSIS — G47.33 OSA ON CPAP: Primary | ICD-10-CM

## 2021-03-15 PROCEDURE — 99214 OFFICE O/P EST MOD 30 MIN: CPT | Mod: 95 | Performed by: INTERNAL MEDICINE

## 2021-03-15 ASSESSMENT — MIFFLIN-ST. JEOR: SCORE: 1632.36

## 2021-03-15 NOTE — TELEPHONE ENCOUNTER
----- Message from Tiana Kraus MD sent at 3/12/2021  3:12 PM CST -----  Regarding: RE: PT NEEDS COPY OF STUDY SENT TO HIM BY EMAIL ASAP SO HE CAN RETURN TO WORK  Hi,    Can anyone please send the patient  a copy of his sleep study report as soon as possible.    Thanks,  Miranda  ----- Message -----  From: Samantha Hoskins  Sent: 3/8/2021   4:32 PM CST  To: Tiana Kraus MD, #  Subject: PT NEEDS COPY OF STUDY SENT TO HIM BY EMAIL #    Reason for call:  Other   Patient called regarding (reason for call): PT NEEDS COPY OF SLEEP STUDY SENT TO HIM BY EMAIL ASAP HE IS UNABLE TO RETURN TO WORK WITH OUT IT HIS EMAIL ADDRESS IS   [email or web address]   Additional comments:  CALL PT IF THIS CAN NOT BE DONE   Phone number to reach patient:  Home number on file [phone] (home)    Best Time:  ANY    Can we leave a detailed message on this number?  YES    Travel screening: Not Applicable

## 2021-03-15 NOTE — PROGRESS NOTES
Agus is a 50 year old who is being evaluated via a billable video visit.      How would you like to obtain your AVS? Mail a copy  If the video visit is dropped, the invitation should be resent by: Text to cell phone: [phone]   Will anyone else be joining your video visit? No       Rosalinda Chen, PAYTON on 3/15/2021 at 9:55 AM      Video-Visit Details    Type of service:  Video Visit  Video Start Time:1033AM  Video End Time:1055AM  Originating Location (pt. Location): Home    Distant Location (provider location):  [facility]     Platform used for Video Visit: [facility]       Sleep clinic follow up visit note:    Date on this visit: 3/15/2021    Primary Physician: No Ref-Primary, Physician     Chief complaint: f/u BEST, review CPAP compliance, need letter for FAA.    Agus Kang 50 year old with h/o severe BEST,  who presents for virtual visit today for follow up of BEST, review CPAP compliance and he requests a letter for the FAA along with copy of the CPAP download.  He was set up at [facility] on November 29, 2019 with a Tom Respironics REMstar Auto with A-Flex (M Series) Auto. Pressures were set at 5-15 cm H2O.   Patient s ramp is 5 cm H2O for 45 min and FLEX/EPR is 2.  Patient received a Tom Respironics Dreamwear   Full Face mask size Large, heated tubing and heated humidifier.     He reports using CPAP regularly during sleep.  He uses a full facemask and denies any interface concerns.  Pressure  settings feel comfortable.  He denies reports of snoring/gasping for air with the device.  He goes to bed between 9 to 9:30 PM and wakes up around 6 AM.  He reports feeling rested upon awakening from sleep.  He denies fatigue or excessive daytime sleepiness.  Denies any drowsiness while driving.  He feels  relaxed when he puts the mask on and has got used to CPAP. He continues to use  the mouthguard for bruxism  He reports that he quit drinking and also has been exercising  regularly.  His current weight is 170 pounds.    SLEEP SCALES:  ESS pre CPAP treatment 11/6/19:12/24  ESS today:0/24    Previous sleep study report:  WatchPAT results(11/15/2019) :  Total Recording Time: 7  hrs, 3 min   Total Sleep Time: 6 hrs, 44 min   % of Sleep Time REM: 22.4%     Respiratory:   Snoring: Snoring was present.  Respiratory events: The PAT respiratory disturbance index [pRDI] was 54 events per hour.  The PAT apnea/hypopnea index [pAHI] was 53 events per hour.  HERIBERTO was 52 events per hour.  During REM sleep the pAHI was 39.   Sleep Associated Hypoxemia: sustained hypoxemia was present. Mean oxygen saturation was 91 %.  Minimum was 70 %.  Time with saturation less than 88% was 75 minutes.     Heart Rate: By pulse oximetry normal rate was noted.     Position: Percent of time spent: supine -87 %, prone -0 %, on right -13 %, on left -0 5%.   pAHI was 55 per hour supine, - per hour prone, 39 per hour on right side, and - per hour on left side.     Assessment:   Severe obstructive sleep apnea.   Sleep associated hypoxemia was present.      DOWNLOADABLE COMPLIANCE DATA:   From 2/9/2021 through 3/10/2021 reveals that he used the device for 26 out of 30 days with an averag use of 7.47 hours on the days used. There was no significant air leak. Residual AHI was 3.9 per hour. Mean pressure settings:8.1; Peak average pressure: 9.7 and device pressure<= 90% of time: 10.3 cm water      Allergies:    Allergies   Allergen Reactions     Cats        Medications:    No current outpatient medications on file.       Problem List:  There are no active problems to display for this patient.       Past Medical/Surgical History:  BEST  Lasik surgery    Social History:  Social History     Socioeconomic History     Marital status:      Spouse name: Not on file     Number of children: Not on file     Years of education: Not on file     Highest education level: Not on file   Occupational History     Not on file   Social  "Needs     Financial resource strain: Not on file     Food insecurity     Worry: Not on file     Inability: Not on file     Transportation needs     Medical: Not on file     Non-medical: Not on file   Tobacco Use     Smoking status: Former Smoker     Packs/day: 0.00     Types: Cigarettes     Smokeless tobacco: Current User     Types: Chew   Substance and Sexual Activity     Alcohol use: Not on file     Drug use: Not on file     Sexual activity: Not on file   Lifestyle     Physical activity     Days per week: Not on file     Minutes per session: Not on file     Stress: Not on file   Relationships     Social connections     Talks on phone: Not on file     Gets together: Not on file     Attends Orthodox service: Not on file     Active member of club or organization: Not on file     Attends meetings of clubs or organizations: Not on file     Relationship status: Not on file     Intimate partner violence     Fear of current or ex partner: Not on file     Emotionally abused: Not on file     Physically abused: Not on file     Forced sexual activity: Not on file   Other Topics Concern     Not on file   Social History Narrative     Not on file       Family History:  No family history on file.      Physical Examination:  Vitals: Ht 1.778 m (5' 10\")   Wt 77.1 kg (170 lb)   BMI 24.39 kg/m    BMI= Body mass index is 24.39 kg/m .         [place] Total Score 3/15/2021   Total score - [place] 0       General: No apparent distress, appropriately groomed  Head: Normocephalic, atraumatic  Chest: No cough, no audible wheezing, able to talk in full sentences  Psych: coherent speech, normal rate and volume, able to articulate logical thoughts, able   to abstract reason, no tangential thoughts, no hallucinations   or delusions  His Affect is normal  Neuro:  Mental status: Alert and  Oriented X 3  Speech: normal       Impression/Plan:  Obstructive sleep apnea: Pt reports adequate compliance with CPAP therapy and reports positive benefits " "with CPAP use.   BEST is adequately controlled with Auto CPAP at the current settings per compliance DL.   Prescription provided for renewal of CPAP supplies.  Recommended him  to continue using the CPAP regularly during sleep and instructed   to get  the supplies for the PAP replaced regularly.    Patient instructed to remember to bring CPAP with him/her if hospitalized and if anticipating procedure that requires sedation/surgery to be sure to discuss with the provider/surgeon that he has sleep apnea and uses CPAP therapy.  Letter to FAA documenting the CPAP compliance was generated at patient's request.    Sleep associated hypoxemia was noted during the home sleep study from 2019.  Recommended obtaining an overnight oximetry(LONA) with the CPAP at the current pressure settings, along with parallel compliance download, to check for resolution of the hypoxemia with CPAP therapy.  The results of the LONA will be communicated with patient via MyChart.    Encouraged healthy diet, and exercise.    Patient was strongly advised to avoid driving, operating any heavy machinery or other hazardous situations while drowsy or sleepy.  Patient was counseled on the importance of driving while alert, to pull over if drowsy, or nap before getting into the vehicle if sleepy.      Plan is to follow up in 6 months.     CC: No ref. provider found        The above note was dictated using voice recognition software. Although reviewed after completion, some word and grammatical error may remain . Please contact the author for any clarifications.    \"I spent a total of  30 minutes  with Agus Kang during today's video visit.,most of this time was spent counseling the patient and  coordinating care regarding  BEST, CPAP therapy,weight management , going over PAP download/sleep study , providing  letter to FAA, LONA, chart review  including documentation and further activities as noted above.\"      Tiana Kraus MD  M " [facility]  [id number] [place] [id number] [phone]  Dept: [phone]

## 2021-03-15 NOTE — PATIENT INSTRUCTIONS
Your BMI is Body mass index is 24.39 kg/m .  Weight management is a personal decision.  If you are interested in exploring weight loss strategies, the following discussion covers the approaches that may be successful. Body mass index (BMI) is one way to tell whether you are at a healthy weight, overweight, or obese. It measures your weight in relation to your height.  A BMI of 18.5 to 24.9 is in the healthy range. A person with a BMI of 25 to 29.9 is considered overweight, and someone with a BMI of 30 or greater is considered obese. More than two-thirds of American adults are considered overweight or obese.  Being overweight or obese increases the risk for further weight gain. Excess weight may lead to heart disease and diabetes.  Creating and following plans for healthy eating and physical activity may help you improve your health.  Weight control is part of healthy lifestyle and includes exercise, emotional health, and healthy eating habits. Careful eating habits lifelong are the mainstay of weight control. Though there are significant health benefits from weight loss, long-term weight loss with diet alone may be very difficult to achieve- studies show long-term success with dietary management in less than 10% of people. Attaining a healthy weight may be especially difficult to achieve in those with severe obesity. In some cases, medications, devices and surgical management might be considered.  What can you do?  If you are overweight or obese and are interested in methods for weight loss, you should discuss this with your provider.     Consider reducing daily calorie intake by 500 calories.     Keep a food journal.     Avoiding skipping meals, consider cutting portions instead.    Diet combined with exercise helps maintain muscle while optimizing fat loss. Strength training is particularly important for building and maintaining muscle mass. Exercise helps reduce stress, increase energy, and improves fitness.  Increasing exercise without diet control, however, may not burn enough calories to loose weight.       Start walking three days a week 10-20 minutes at a time    Work towards walking thirty minutes five days a week     Eventually, increase the speed of your walking for 1-2 minutes at time    In addition, we recommend that you review healthy lifestyles and methods for weight loss available through the National Institutes of Health patient information sites:  http://win.niddk.nih.gov/publications/index.htm    And look into health and wellness programs that may be available through your health insurance provider, employer, local community center, or jacob club.

## 2021-03-16 ENCOUNTER — MYC MEDICAL ADVICE (OUTPATIENT)
Dept: SLEEP MEDICINE | Facility: CLINIC | Age: 51
End: 2021-03-16

## 2021-03-17 NOTE — TELEPHONE ENCOUNTER
----- Message from Tiana Kraus MD sent at 3/16/2021  9:58 PM CDT -----  Regarding: please e-mail patient the letter to FAA along with complinace DL  Don Landaverde and Ting,    Would either one of you kindly e-mail patient  the letter that I've generated to FAA along with compliance DL from 2/9/21-3/10/21 that has been scanned in the epic.    His email address can be found  demographics in the [facility]. [email or web address]    Thank you,  Miranda

## 2021-10-23 ENCOUNTER — HEALTH MAINTENANCE LETTER (OUTPATIENT)
Age: 51
End: 2021-10-23

## 2022-02-12 ENCOUNTER — HEALTH MAINTENANCE LETTER (OUTPATIENT)
Age: 52
End: 2022-02-12

## 2022-10-10 ENCOUNTER — HEALTH MAINTENANCE LETTER (OUTPATIENT)
Age: 52
End: 2022-10-10

## 2023-02-18 ENCOUNTER — HEALTH MAINTENANCE LETTER (OUTPATIENT)
Age: 53
End: 2023-02-18

## 2024-03-16 ENCOUNTER — HEALTH MAINTENANCE LETTER (OUTPATIENT)
Age: 54
End: 2024-03-16